# Patient Record
Sex: MALE | Race: WHITE
[De-identification: names, ages, dates, MRNs, and addresses within clinical notes are randomized per-mention and may not be internally consistent; named-entity substitution may affect disease eponyms.]

---

## 2017-06-15 ENCOUNTER — HOSPITAL ENCOUNTER (OUTPATIENT)
Dept: HOSPITAL 72 - PAN | Age: 67
Discharge: HOME | End: 2017-06-15
Payer: MEDICARE

## 2017-06-15 DIAGNOSIS — Z01.818: Primary | ICD-10-CM

## 2017-06-15 PROCEDURE — 99201: CPT

## 2017-06-15 NOTE — GI INITIAL CONSULT NOTE
History of Present Illness


General


Date patient seen:  Clarence 15, 2017


Time patient seen:  14:51


Referring physician:  DENISSE


Reason for Consultation:  SCREENING COLONOSCOPY





Present Illness


HPI


67 year old male patient referred by Dr. Toribio for routine colonoscopy.   Last 

colonoscopy over 10 years ago with unremarkable results.  The patient presents 

today with general GI symptoms.  No documented medical history.  States he had 

rectal surgery @ the age of 41 but unable to recall exact procedure.  Denies N/V

/D or weight loss.


Home Meds


Reported Medications


Biotin (BIOTIN) Unknown Strength Tab.rapdis, PO, TAB


   6/15/17


Vit B12/Pyridoxine/Thiamine (APATATE LIQUID) Unknown Strength Liquid, PO, ML


   6/15/17


Cholecalciferol (Vitamin D3)* (VITAMIN D*) Unknown Strength Tablet, ORAL DAILY, 

#30 TAB


   6/15/17


Ginkgo Biloba (GINKGO) Unknown Strength Tablet, PO, TAB


   6/15/17


No Known Medications* (NKM - No Known Medications*)  ., 0 ., 0 Refills


   6/15/17


Med list reviewed/reconciled:  Yes


Allergies:  


Coded Allergies:  


     No Known Allergies (Unverified , 6/15/17)





Patient History


History Provided By:  Patient


PMH Narrative


denies





PSHx


rectal surgery @ age 41.


Social History:  Denies: alcohol use, drug use, other, smoking





Review of Systems


All Other Systems:  negative except mentioned in HPI





Physical Exam


T 97.8


/71


P 79


96 RA





HT 5/8


.6 lbs


General Appearance:  well appearing, no apparent distress, alert


Head:  normocephalic


EENT:  PERRL/EOMI, normal ENT inspection


Neck:  full range of motion


Respiratory:  normal breath sounds, no retraction


Cardiovascular:  normal peripheral pulses, normal rate, regular rhythm


Gastrointestinal:  non tender, soft, normal bowel sounds


Rectal:  deferred


Musculoskeletal:  normal inspection, back normal


Neurologic:  normal inspection, alert, oriented x3, responsive


Psychiatric:  normal inspection, judgement/insight normal, memory normal


Skin:  normal inspection, normal color, no rash, warm/dry, palpation normal, 

well hydrated, normal turgor


Lymphatic:  normal inspection, no adenopathy





GI: Plan


Problems:  


(1) Colonoscopy planned


(2) History of rectal surgery


Plan


Colonoscopy scheduled 6/19/17.


- [facility] prep instructions given.





Seen with Dr. Ricks.


Thank you for referring this patient.











Janey Montgomery N.P. Clarence 15, 2017 15:00

## 2017-06-19 ENCOUNTER — HOSPITAL ENCOUNTER (OUTPATIENT)
Dept: HOSPITAL 72 - GAS | Age: 67
Discharge: HOME | End: 2017-06-19
Payer: MEDICARE

## 2017-06-19 VITALS — SYSTOLIC BLOOD PRESSURE: 124 MMHG | DIASTOLIC BLOOD PRESSURE: 69 MMHG

## 2017-06-19 VITALS — DIASTOLIC BLOOD PRESSURE: 76 MMHG | SYSTOLIC BLOOD PRESSURE: 114 MMHG

## 2017-06-19 VITALS — SYSTOLIC BLOOD PRESSURE: 112 MMHG | DIASTOLIC BLOOD PRESSURE: 80 MMHG

## 2017-06-19 VITALS — DIASTOLIC BLOOD PRESSURE: 80 MMHG | SYSTOLIC BLOOD PRESSURE: 131 MMHG

## 2017-06-19 VITALS — SYSTOLIC BLOOD PRESSURE: 111 MMHG | DIASTOLIC BLOOD PRESSURE: 75 MMHG

## 2017-06-19 VITALS — SYSTOLIC BLOOD PRESSURE: 121 MMHG | DIASTOLIC BLOOD PRESSURE: 69 MMHG

## 2017-06-19 VITALS — SYSTOLIC BLOOD PRESSURE: 114 MMHG | DIASTOLIC BLOOD PRESSURE: 74 MMHG

## 2017-06-19 VITALS — HEIGHT: 68 IN | BODY MASS INDEX: 24.55 KG/M2 | WEIGHT: 162 LBS

## 2017-06-19 VITALS — SYSTOLIC BLOOD PRESSURE: 110 MMHG | DIASTOLIC BLOOD PRESSURE: 78 MMHG

## 2017-06-19 DIAGNOSIS — K64.8: ICD-10-CM

## 2017-06-19 DIAGNOSIS — E78.5: ICD-10-CM

## 2017-06-19 DIAGNOSIS — D12.7: ICD-10-CM

## 2017-06-19 DIAGNOSIS — Z12.11: Primary | ICD-10-CM

## 2017-06-19 PROCEDURE — 94150 VITAL CAPACITY TEST: CPT

## 2017-06-19 PROCEDURE — 94003 VENT MGMT INPAT SUBQ DAY: CPT

## 2017-06-19 PROCEDURE — 45380 COLONOSCOPY AND BIOPSY: CPT

## 2017-06-19 PROCEDURE — 93005 ELECTROCARDIOGRAM TRACING: CPT

## 2017-06-19 NOTE — IMMEDIATE POST-OP EVALUATION
Immediate Post-Op Evalulation


Immediate Post-Op Evalulation


Procedure:  Screening colonoscopy


Date of Evaluation:  Jun 19, 2017


Time of Evaluation:  10:20


IV Fluids:  400


Blood Pressure Systolic:  118


Blood Pressure Diastolic:  78


Pulse Rate:  59


Respiratory Rate:  12


O2 Sat by Pulse Oximetry:  100


Temperature (Fahrenheit):  97.0


Pain Score (1-10):  0


Nausea:  No


Vomiting:  No


Complications


No complication


Patient Status:  awake, patent, none


Hydration Status:  adequate


Drug:  None











DARRIUS CORRIGAN M.D. Jun 19, 2017 09:48

## 2017-06-19 NOTE — 48 HOUR POST ANESTHESIA EVAL
Post Anesthesia Evaluation


Procedure:  Screening colonoscopy


Date of Evaluation:  Jun 19, 2017


Time of Evaluation:  10:50


Blood Pressure Systolic:  111


0:  75


Pulse Rate:  58


Respiratory Rate:  12


O2 Sat by Pulse Oximetry:  100


Nausea:  No


Vomiting:  No


Pain Intensity:  0


Hydration Status:  adequate


Cardiopulmonary Status:


Stable


Mental Status/LOC:  patient returned to baseline


Follow-up Care/Observations:


As per surgery


Post-Anesthesia Complications:


No anesthetic complication


Follow-up care needed:  N/A











DARRIUS CORRIGAN M.D. Jun 19, 2017 10:19

## 2017-06-19 NOTE — PRE-PROCEDURE NOTE/ATTESTATION
Pre-Procedure Note/Attestation


Complete Prior to Procedure


Planned Procedure:  not applicable


Procedure Narrative:


colonoscopy





Indications for Procedure


Pre-Operative Diagnosis:


screening





Attestation


I attest that I discussed the nature of the procedure; its benefits; risks and 

complications; and alternatives (and the risks and benefits of such alternatives

), prior to the procedure, with the patient (or the patient's legal 

representative).





I attest that, if there was a reasonable possibility of needing a blood 

transfusion, the patient (or the patient's legal representative) was given the 

[facility] standardized written summary, pursuant 

to the Chris Lesvia Blood Safety Act ([place] Health and Safety Code # 1645, as 

amended).





I attest that I re-evaluated the patient just prior to the surgery and that 

there has been no change in the patient's H&P, except as documented below:











VIRGILIO AVILES Jun 19, 2017 09:43

## 2017-06-19 NOTE — ENDOSCOPY PROCEDURE NOTE
Endoscopy Procedure Note


Indication for Procedure:  screening


Procedures Performed:  colonoscopy


Operative Findings/Diagnosis:  2 polyps


Specimen:  yes


Pt Tolerated Procedure Well:  Yes


Estimated Blood Loss:  none


Anesthesiologist:  patsy


Anesthesia:  MAC


Implant(s) used?:  No


50 yrs or older w/o bx or poly:  No


10yrs. F/U not recommended:  Yes


If not recommended, why?:  Above average risk


10 yrs. F/U needed:  Yes


18 years or older w/prev. colo:  Yes


<3yrs. since last colonoscopy:  No











VIRGILIO AVILES Jun 19, 2017 10:03

## 2017-06-19 NOTE — ANETHESIA PREOPERATIVE EVAL
Anesthesia Pre-op PMH/ROS


General


Date of Evaluation:  Jun 19, 2017


Time of Evaluation:  19:20


Anesthesiologist:  Dinesh


ASA Score:  ASA 2


Mallampati Score


Class I : Soft palate, uvula, fauces, pillars visible


Class II: Soft palate, uvula, fauces visible


Class III: Soft palate, base of uvula visible


Class IV: Only hard plate visible


Mallampati Classification:  Class II


Surgeon:  Rambo


Diagnosis:  Screening


Surgical Procedure:  Colonoscopy


Family History:  no anesthesia problems


Allergies:  


Coded Allergies:  


     No Known Allergies (Unverified , 6/15/17)


Medications:  see eMAR





Past Medical History


Cardiovascular:  Denies: CAD, HTN, MI, arrhythmia, other, valve dz


Pulmonary:  Denies: COPD, SULMA, asthma, other


Gastrointestinal/Genitourinary:  Denies: CRI, ESRD, GERD, other


Neurologic/Psychiatric:  Denies: CVA, TIA, dementia, depression/anxiety, other


Endocrine:  Denies: DM, hypothyroidism, other, steroids


HEENT:  Denies: [place] (L), [place] (R), cataract (L), cataract (R), glaucoma, other


Hematology/Immune:  Denies: DVT, anemia, bleeding disorder, other


Musculoskeletal/Integumentary:  Denies: DDD, DJD, OA, RA, edema, other


PMH Narrative:


Hyperlipidemia


PSxH Narrative:


Anal sphincterotomy





Anesthesia Pre-op Phys. Exam


Physician Exam





Last Vital Signs








  Date Time  Temp Pulse Resp B/P Pulse Ox O2 Delivery O2 Flow Rate FiO2


 


6/19/17 08:37 96.9 74 18 131/80 98 Room Air  








Constitutional:  NAD


Neurologic:  CN 2-12 intact


Cardiovascular:  RRR, no M/R/G


Respiratory:  CTA





Airway Exam


Mallampati Score:  Class II


MO:  full


ROM:  full


Teeth:  intact





Anesthesia Pre-op A/P


Studies


Pre-op Studies:  EKG - NSR





Risk Assessment & Plan


Assessment:


Healthy male for colonoscopy


Plan:


GA, TIVA





Pre-Antibiotics


Drug:  None











DARRIUS CORRIGAN M.D. Jun 19, 2017 09:44

## 2017-06-19 NOTE — SHORT STAY SURGERY H&P
History of Present Illness


History of Present Illness


Chief Complaint


ee dictation


HPI


Alberto Burns is a 67 year old male who was admitted on  for Colon Screening





Patient History


Allergies:  


Coded Allergies:  


     No Known Allergies (Unverified , 6/15/17)


PAST MEDICAL HISTORY:  


Past Surgeries:  


Social History:  





Medication History


Scheduled


Cholecalciferol (Vitamin D3)* (Vitamin D*), Unknown Dose ORAL DAILY, (Reported)





Miscellaneous Medications


Biotin (Biotin), Unknown Dose PO, (Reported)


Ginkgo Biloba (Ginkgo), Unknown Dose PO, (Reported)


Vit B12/Pyridoxine/Thiamine (Apatate Liquid), Unknown Dose PO, (Reported)





Physical Exam


Vital Signs





Last Vital Signs








  Date Time  Temp Pulse Resp B/P Pulse Ox O2 Delivery O2 Flow Rate FiO2


 


6/19/17 08:37 96.9 74 18 131/80 98 Room Air  











Plan


Attestation


Are the patient's medical conditions optimized for surgery?











VIRGILIO AVILES Jun 19, 2017 09:43

## 2017-06-19 NOTE — PROCEDURE NOTE
DATE OF PROCEDURE:  06/19/2017



PROCEDURE:  Colonoscopy with biopsy.



SURGEON:  Carlos Ricks M.D.



ANESTHESIOLOGIST:  _____



INSTRUMENT:  Olympus adult flexible colonoscope.



INDICATION:  Screening colonoscopy.



DESCRIPTION OF PROCEDURE:  The procedure, risks, benefits, and

possible consequences, including hemorrhage, aspiration, perforation and

infection, and alternative treatments, were explained to the patient/legal

guardian by Dr. Carlos Ricks and the patient/legal guardian understood

and accepted these risks.



After informed consent was obtained and the patient was adequately

sedated, first rectal examination was performed, which was positive for

internal hemorrhoids.  Then, the scope was advanced from the rectum into

the cecum, documented by appendiceal orifice, ileocecal valve, and right

upper quadrant palpation.  Quality was excellent.



The patient has 2 polyps in the rectosigmoid area, the largest one

measured about 4 mm, both of them removed with the cold biopsy forceps

technique.



Retroflexion rectum was performed, which shows evidence of _____

nonbleeding internal hemorrhoids.



SUMMARY OF FINDINGS:

1. Two colonic polyp removed, see above for detail.

2. Internal hemorrhoids.



RECOMMENDATIONS:

1. Follow up biopsies and treat accordingly.

2. We will recommend repeat colonoscopy in 5 years.



I want to thank,  _____ , for this kind referral.









  ______________________________________________

  Carlos Ricks M.D.





DR:  LISA

D:  06/19/2017 10:01

T:  06/19/2017 13:02

JOB#:  [id number]

CC:  __________

## 2017-06-20 NOTE — CARDIOLOGY REPORT
--------------- APPROVED REPORT --------------





EKG Measurement

Heart [id number]

SD [id number]

[id number]

[id number]

[id number]





Normal sinus rhythm

Normal ECG

## 2019-08-20 ENCOUNTER — HOSPITAL ENCOUNTER (OUTPATIENT)
Dept: HOSPITAL 72 - PAN | Age: 69
Discharge: HOME | End: 2019-08-20
Payer: MEDICARE

## 2019-08-20 VITALS — DIASTOLIC BLOOD PRESSURE: 68 MMHG | SYSTOLIC BLOOD PRESSURE: 110 MMHG

## 2019-08-20 DIAGNOSIS — K25.9: Primary | ICD-10-CM

## 2019-08-20 NOTE — GENERAL PROGRESS NOTE
Assessment/Plan


Problem List:  


(1) Gastric ulcer


ICD Codes:  K25.9 - Gastric ulcer, unspecified as acute or chronic, without 

hemorrhage or perforation


SNOMED:  248521132


Status:  stable


Assessment/Plan:


treat for HP


RTC 3 months


repeat colon in 2022





Subjective


ROS Limited/Unobtainable:  Yes


Allergies:  


Coded Allergies:  


     No Known Allergies (Unverified , 6/15/17)





Objective


General Appearance:  alert


EENT:  normal ENT inspection


Neck:  normal alignment


Cardiovascular:  normal peripheral pulses


Respiratory/Chest:  lungs clear


Abdomen:  normal bowel sounds, non tender, soft


Extremities:  non-tender











Carlos Ricks MD Aug 20, 2019 11:07

## 2019-09-03 ENCOUNTER — HOSPITAL ENCOUNTER (OUTPATIENT)
Dept: HOSPITAL 72 - PAN | Age: 69
Discharge: HOME | End: 2019-09-03
Payer: MEDICARE

## 2019-09-03 VITALS — DIASTOLIC BLOOD PRESSURE: 79 MMHG | SYSTOLIC BLOOD PRESSURE: 125 MMHG

## 2019-09-03 DIAGNOSIS — Z98.890: ICD-10-CM

## 2019-09-03 DIAGNOSIS — K25.9: Primary | ICD-10-CM

## 2019-09-03 NOTE — GENERAL PROGRESS NOTE
Assessment/Plan


Problem List:  


(1) Gastric ulcer


ICD Codes:  K25.9 - Gastric ulcer, unspecified as acute or chronic, without 

hemorrhage or perforation


SNOMED:  005426523


(2) History of rectal surgery


ICD Codes:  Z98.890 - Other specified postprocedural states


SNOMED:  324407579


Assessment/Plan:


cont finishing the abx


ppi for 6 weeks


RTC in 2 months for the BT





Subjective


ROS Limited/Unobtainable:  Yes


Allergies:  


Coded Allergies:  


     No Known Allergies (Unverified , 6/15/17)





Objective


General Appearance:  alert


EENT:  normal ENT inspection


Neck:  supple


Cardiovascular:  normal rate


Respiratory/Chest:  lungs clear


Abdomen:  normal bowel sounds, non tender, soft


Extremities:  non-tender











Carlos Ricks MD Sep 3, 2019 11:02

## 2019-11-05 ENCOUNTER — HOSPITAL ENCOUNTER (OUTPATIENT)
Dept: HOSPITAL 72 - PAN | Age: 69
Discharge: HOME | End: 2019-11-05
Payer: MEDICARE

## 2019-11-05 VITALS — DIASTOLIC BLOOD PRESSURE: 83 MMHG | SYSTOLIC BLOOD PRESSURE: 134 MMHG

## 2019-11-05 DIAGNOSIS — K25.9: Primary | ICD-10-CM

## 2019-11-05 DIAGNOSIS — Z98.890: ICD-10-CM

## 2019-11-05 PROCEDURE — 99212 OFFICE O/P EST SF 10 MIN: CPT

## 2019-11-05 PROCEDURE — 83013 H PYLORI (C-13) BREATH: CPT

## 2021-01-11 ENCOUNTER — HOSPITAL ENCOUNTER (OUTPATIENT)
Dept: HOSPITAL 72 - PAN | Age: 71
Discharge: HOME | End: 2021-01-11
Payer: MEDICARE

## 2021-01-11 DIAGNOSIS — R10.9: Primary | ICD-10-CM

## 2021-01-11 PROCEDURE — 99212 OFFICE O/P EST SF 10 MIN: CPT
